# Patient Record
Sex: FEMALE
[De-identification: names, ages, dates, MRNs, and addresses within clinical notes are randomized per-mention and may not be internally consistent; named-entity substitution may affect disease eponyms.]

---

## 2020-01-28 PROBLEM — Z00.00 ENCOUNTER FOR PREVENTIVE HEALTH EXAMINATION: Status: ACTIVE | Noted: 2020-01-28

## 2020-02-06 ENCOUNTER — APPOINTMENT (OUTPATIENT)
Dept: PEDIATRIC NEUROLOGY | Facility: CLINIC | Age: 70
End: 2020-02-06
Payer: MEDICARE

## 2020-02-06 DIAGNOSIS — I63.9 CEREBRAL INFARCTION, UNSPECIFIED: ICD-10-CM

## 2020-02-06 DIAGNOSIS — H53.40 UNSPECIFIED VISUAL FIELD DEFECTS: ICD-10-CM

## 2020-02-06 DIAGNOSIS — Z86.39 PERSONAL HISTORY OF OTHER ENDOCRINE, NUTRITIONAL AND METABOLIC DISEASE: ICD-10-CM

## 2020-02-06 DIAGNOSIS — G47.411 NARCOLEPSY WITH CATAPLEXY: ICD-10-CM

## 2020-02-06 PROCEDURE — 99205 OFFICE O/P NEW HI 60 MIN: CPT

## 2020-02-06 RX ORDER — ESZOPICLONE 3 MG/1
3 TABLET, COATED ORAL
Refills: 0 | Status: ACTIVE | COMMUNITY

## 2020-02-06 RX ORDER — METHYLPHENIDATE HYDROCHLORIDE 10 MG/1
10 TABLET ORAL
Refills: 0 | Status: ACTIVE | COMMUNITY

## 2020-02-06 RX ORDER — TRAZODONE HYDROCHLORIDE 100 MG/1
100 TABLET ORAL
Refills: 0 | Status: ACTIVE | COMMUNITY

## 2020-02-06 NOTE — REASON FOR VISIT
[Initial Consultation] : an initial consultation for [Hypersomnia] : hypersomnia [Other: ____] : [unfilled] [Patient] : patient

## 2020-02-07 NOTE — HISTORY OF PRESENT ILLNESS
[FreeTextEntry1] : This is 69 year old female with history of knee surgery with multiple pulmonary edema, right PCA ischemic stroke in 2012 with PFO closure afterwards, hypothyroidism on Synthroid , Narcolepsy on Ritalin  here for initial consultation for left visual phenomenon\par \par # Vision issues\par She had baseline left incongruent homonymous hemianopsia from strong in 2012. IN august 2019 patient ad episode of left eye shadow that blocker her central vision, lasted for 203 hours presented to ER. Had MRI brain which showed old right PCA stroke. CTA and MRA of head and neck were negative for any stenosis. \par She developed artifact/floaters/shadow (broken mirror feeling) in her left visual filed in blindness zone. \par They did not disappear since then and present all the time. 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

## 2020-02-07 NOTE — CONSULT LETTER
[Consult Letter:] : I had the pleasure of evaluating your patient, [unfilled]. [Please see my note below.] : Please see my note below. [Consult Closing:] : Thank you very much for allowing me to participate in the care of this patient.  If you have any questions, please do not hesitate to contact me. [Sincerely,] : Sincerely, [FreeTextEntry3] : Marlon German\par Child Neurology \par Resident Physician\par

## 2020-02-07 NOTE — PLAN
[FreeTextEntry1] : # Sleep Issues\par \par 1) Obtain sleep study results from her sleep neurologist and initiate treatment with Xyrem \par We will start her with  2.25 g at bedtime and 2.25 g taken 2.5 to 4 hours later. Will Increase the dose by 1.5 g per night at weekly intervals (additional 0.75 g at bedtime and 0.75 g taken 2.5 to 4 hours later) to the effective dose range of 6 g to 9 g per night orally\par 2) Will wean off Lunesta and Ritalin once she is maintained on Xyrem \par 3) Follow up in 6 months \par \par # Visual Phenomenon\par 1) Refer to Neuro-ophthalmology and consider visual rehabilitation  \par 2) Wean off Keppra (wean schedule explained\par \par \par \par \par

## 2020-02-07 NOTE — ASSESSMENT
[FreeTextEntry1] : This is 69 year old female with history of knee surgery with multiple pulmonary edema, right PCA ischemic stroke in 2012 with PFO closure afterwards, hypothyroidism on Synthroid , Narcolepsy on Ritalin  here for initial consultation for left visual phenomenon and sleep consultation.\par \par Her current issues include\par 1. Narcolepsy with cataplexy: This is ongoing since many years and currently she is dependent on Lunesta, Ritalin and trazodone for her. Her symptoms are not well controlled with these medications.  \par \par 2. Her visual symptoms are likely secondary to her stroke. Visual hallucination ranging from simple to complex are reported in Homonymous hemianopsia patients after stroke. It has been hypothesized that this can can appear following a drop in blood flow to the occipital cortex (which might have happened in Ms. Abbott case due to recrudescence of new mild stroke in 2019 in her old occipital stroke.)\par \par As per literature, In hemianopic patients, visual hallucinations seem to result from compensatory hyper activation of neighboring tissue. Visual hallucinations can appear following excessive cortical compensation in the cortex, the striatum or the thalamus*.\par \par she is currently being treated with Keppra for the same with no benefit. We have very low sucpicion of seizure/ epileptic visual aura being reason for this. Thus we advice to discontinue Keppra and get routine EEG to confirm this.  \par \par *Ashley Clay, Bernie Gomez, and Marilyn Poon. "Behavioral consequences and cortical reorganization in homonymous hemianopia." Frontiers in systems neuroscience 10 (2016): 57.

## 2020-02-07 NOTE — PHYSICAL EXAM
[Well-appearing] : well-appearing [Normocephalic] : normocephalic [No dysmorphic facial features] : no dysmorphic facial features [No ocular abnormalities] : no ocular abnormalities [Neck supple] : neck supple [Lungs clear] : lungs clear [Heart sounds regular in rate and rhythm] : heart sounds regular in rate and rhythm [Soft] : soft [No organomegaly] : no organomegaly [No abnormal neurocutaneous stigmata or skin lesions] : no abnormal neurocutaneous stigmata or skin lesions [Straight] : straight [No erick or dimples] : no erick or dimples [No deformities] : no deformities [Alert] : alert [Well related, good eye contact] : well related, good eye contact [Conversant] : conversant [Normal speech and language] : normal speech and language [Follows instructions well] : follows instructions well [Pupils reactive to light and accommodation] : pupils reactive to light and accommodation [No nystagmus] : no nystagmus [No papilledema] : no papilledema [Normal facial sensation to light touch] : normal facial sensation to light touch [No facial asymmetry or weakness] : no facial asymmetry or weakness [Gross hearing intact] : gross hearing intact [Equal palate elevation] : equal palate elevation [Good shoulder shrug] : good shoulder shrug [Normal tongue movement] : normal tongue movement [Midline tongue, no fasciculations] : midline tongue, no fasciculations [Normal axial and appendicular muscle tone] : normal axial and appendicular muscle tone [Gets up on table without difficulty] : gets up on table without difficulty [No pronator drift] : no pronator drift [Normal finger tapping and fine finger movements] : normal finger tapping and fine finger movements [No abnormal involuntary movements] : no abnormal involuntary movements [5/5 strength in proximal and distal muscles of arms and legs] : 5/5 strength in proximal and distal muscles of arms and legs [Walks and runs well] : walks and runs well [Able to do deep knee bend] : able to do deep knee bend [Able to walk on heels] : able to walk on heels [Able to walk on toes] : able to walk on toes [2+ biceps] : 2+ biceps [Triceps] : triceps [Knee jerks] : knee jerks [Ankle jerks] : ankle jerks [No ankle clonus] : no ankle clonus [Localizes LT and temperature] : localizes LT and temperature [No dysmetria on FTNT] : no dysmetria on FTNT [Good walking balance] : good walking balance [Normal gait] : normal gait [Able to tandem well] : able to tandem well [Negative Romberg] : negative Romberg [de-identified] : Left Homonymous Hemianopsia

## 2020-02-07 NOTE — QUALITY MEASURES
[Complain of daytime sleepiness?] : Does your child complain of daytime sleepiness? Yes [SleepDisorders] : Narcolepsy with cataplexy  [Snore at night?] : Does your child snore at night? No